# Patient Record
Sex: MALE | Race: WHITE
[De-identification: names, ages, dates, MRNs, and addresses within clinical notes are randomized per-mention and may not be internally consistent; named-entity substitution may affect disease eponyms.]

---

## 2021-08-16 ENCOUNTER — HOSPITAL ENCOUNTER (INPATIENT)
Dept: HOSPITAL 49 - FER | Age: 46
LOS: 3 days | Discharge: OTHER INSTITUTION | DRG: 208 | End: 2021-08-19
Attending: INTERNAL MEDICINE | Admitting: INTERNAL MEDICINE
Payer: COMMERCIAL

## 2021-08-16 VITALS — HEIGHT: 65.98 IN | BODY MASS INDEX: 35.08 KG/M2 | WEIGHT: 218.26 LBS

## 2021-08-16 DIAGNOSIS — U07.1: Primary | ICD-10-CM

## 2021-08-16 DIAGNOSIS — I10: ICD-10-CM

## 2021-08-16 DIAGNOSIS — Z87.442: ICD-10-CM

## 2021-08-16 DIAGNOSIS — J96.01: ICD-10-CM

## 2021-08-16 DIAGNOSIS — J12.82: ICD-10-CM

## 2021-08-16 LAB
BASOPHIL: 0.2 % (ref 0–2)
EOSINOPHIL: 0 % (ref 0–5)
HCT: 42.3 % (ref 42–52)
HGB BLD-MCNC: 14.4 G/DL (ref 13.2–18)
LYMPHOCYTE: 5.3 % (ref 15–48)
MCH RBC QN AUTO: 30 PG (ref 25–31)
MCHC RBC AUTO-ENTMCNC: 34 G/DL (ref 32–36)
MCV: 88.1 FL (ref 78–100)
MONOCYTE: 2.6 % (ref 0–12)
MPV: 12.1 FL (ref 6–9.5)
NEUTROPHIL: 91.5 % (ref 41–80)
NRBC: 0
PLT: 207 K/UL (ref 150–400)
RBC MORPHOLOGY: NORMAL
RBC: 4.8 M/UL (ref 4.7–6)
RDW: 14.5 % (ref 11.5–14)
WBC: 9.1 K/UL (ref 4–10.5)

## 2021-08-16 PROCEDURE — C9113 INJ PANTOPRAZOLE SODIUM, VIA: HCPCS

## 2021-08-16 PROCEDURE — C9399 UNCLASSIFIED DRUGS OR BIOLOG: HCPCS

## 2021-08-16 PROCEDURE — U0002 COVID-19 LAB TEST NON-CDC: HCPCS

## 2021-08-17 LAB
ALBUMIN SERPL-MCNC: 3.1 G/DL (ref 3.4–5)
ALKALINE PHOSHATASE: 57 U/L (ref 46–116)
ALT SERPL-CCNC: 78 U/L (ref 16–63)
AST: 55 U/L (ref 15–37)
BACTERIA: (no result)
BILIRUBIN - TOTAL: 0.5 MG/DL (ref 0.2–1)
BILIRUBIN: NEGATIVE MG/DL
BLOOD: (no result) ERY/UL
BUN SERPL-MCNC: 14 MG/DL (ref 7–18)
BUN/CREAT RATIO (CALC): 13.3 RATIO
C-REACTIVE PROTEIN: >18 MG/DL (ref ?–0.9)
CHLORIDE: 96 MMOL/L (ref 98–107)
CLARITY UR: CLEAR
CO2 (BICARBONATE): 26 MMOL/L (ref 21–32)
COLOR: (no result)
CREATININE: 1.05 MG/DL (ref 0.67–1.17)
GLOBULIN (CALCULATION): 3.6 G/DL
GLUCOSE (U): NORMAL MG/DL
GLUCOSE SERPL-MCNC: 154 MG/DL (ref 74–106)
LACTIC ACID: 1.8 MMOL/L (ref 0.4–1.9)
LDH1 SERPL-CCNC: 380 U/L (ref 85–227)
LEUKOCYTES: NEGATIVE LEU/UL
MAGNESIUM SERPL-MCNC: 2 MG/DL (ref 1.8–2.4)
NITRITE: NEGATIVE MG/DL
POTASSIUM: 4 MMOL/L (ref 3.5–5.1)
PRO-BNP: 179 PG/ML (ref ?–125)
PROTEIN: (no result) MG/DL
SPECIFIC GRAVITY: 1.01 (ref 1–1.03)
TOTAL PROTEIN: 6.7 G/DL (ref 6.4–8.2)
URINARY RBC: (no result)
URINARY WBC: (no result)
UROBILINOGEN: 0.2 MG/DL (ref 0.2–1)

## 2021-08-17 PROCEDURE — XW033E5 INTRODUCTION OF REMDESIVIR ANTI-INFECTIVE INTO PERIPHERAL VEIN, PERCUTANEOUS APPROACH, NEW TECHNOLOGY GROUP 5: ICD-10-PCS | Performed by: INTERNAL MEDICINE

## 2021-08-17 PROCEDURE — 5A09457 ASSISTANCE WITH RESPIRATORY VENTILATION, 24-96 CONSECUTIVE HOURS, CONTINUOUS POSITIVE AIRWAY PRESSURE: ICD-10-PCS | Performed by: INTERNAL MEDICINE

## 2021-08-17 PROCEDURE — 8E0ZXY6 ISOLATION: ICD-10-PCS | Performed by: INTERNAL MEDICINE

## 2021-08-18 LAB
ALBUMIN SERPL-MCNC: 2.5 G/DL (ref 3.4–5)
ALKALINE PHOSHATASE: 55 U/L (ref 46–116)
ALT SERPL-CCNC: 63 U/L (ref 16–63)
AST: 59 U/L (ref 15–37)
BASOPHIL: 0.1 % (ref 0–2)
BILIRUBIN - TOTAL: 0.5 MG/DL (ref 0.2–1)
BUN SERPL-MCNC: 24 MG/DL (ref 7–18)
BUN/CREAT RATIO (CALC): 24.7 RATIO
CHLORIDE: 104 MMOL/L (ref 98–107)
CO2 (BICARBONATE): 24 MMOL/L (ref 21–32)
CREATININE: 0.97 MG/DL (ref 0.67–1.17)
EOSINOPHIL: 0 % (ref 0–5)
GLOBULIN (CALCULATION): 4.2 G/DL
GLUCOSE SERPL-MCNC: 126 MG/DL (ref 74–106)
HCT: 40.8 % (ref 42–52)
HGB BLD-MCNC: 13.6 G/DL (ref 13.2–18)
LYMPHOCYTE: 8.6 % (ref 15–48)
MCH RBC QN AUTO: 30 PG (ref 25–31)
MCHC RBC AUTO-ENTMCNC: 33.3 G/DL (ref 32–36)
MCV: 90.1 FL (ref 78–100)
MONOCYTE: 4.5 % (ref 0–12)
MPV: 12.1 FL (ref 6–9.5)
NEUTROPHIL: 86.1 % (ref 41–80)
NRBC: 0
PLT: 251 K/UL (ref 150–400)
POTASSIUM: 4.8 MMOL/L (ref 3.5–5.1)
RBC MORPHOLOGY: NORMAL
RBC: 4.53 M/UL (ref 4.7–6)
RDW: 15.1 % (ref 11.5–14)
TOTAL PROTEIN: 6.7 G/DL (ref 6.4–8.2)
WBC: 9.1 K/UL (ref 4–10.5)

## 2021-08-18 PROCEDURE — 0BH17EZ INSERTION OF ENDOTRACHEAL AIRWAY INTO TRACHEA, VIA NATURAL OR ARTIFICIAL OPENING: ICD-10-PCS | Performed by: INTERNAL MEDICINE

## 2021-08-18 PROCEDURE — 5A1935Z RESPIRATORY VENTILATION, LESS THAN 24 CONSECUTIVE HOURS: ICD-10-PCS | Performed by: INTERNAL MEDICINE

## 2021-08-18 NOTE — NUR
MD NOTIFIED OF PT VITALS AT THIS TIME BY TELEPHONE. /86 , HR 86
RR 39 O2 85%. MD STATED TO MONITOR PT CLOSELY.

## 2021-08-18 NOTE — NUR
PT GIVEN MEDICATION FOR SEDATION AT THIS TIME. MD BEGAN INTUBATION. PT VITALS
/91  HR 93 O2 61% ON BIPAP 100% FIO2. ET TUBE PLACED BUT PT BIT
CUFF AND DEFLATED IT, O2 SATS TRENDING DOWN TO 48%, A CODE WAS CALLED AT THIS
TIME WHILE MD TOOK A BOUGIE TO REPLACE ET TUBE.
 
1352- A SECOND INTUBATION ATTEMPT WAS MADE BY MD TO REPLACE ET TUBE. PT PLACED
ON VENT AND CURRENT VITALS ARE /105   RR 33 O2 88%. MD NOTIFIED OF
VITALS AT 1440. NO ORDERED VERSED PUSH AND FETANYL IVP.
 
1415- PTS WIFE WAS NOTIFIED OF INTUBATION, CURRENT VITALS, AND STATUS.

## 2021-08-19 LAB
ALBUMIN SERPL-MCNC: 2.1 G/DL (ref 3.4–5)
ALKALINE PHOSHATASE: 47 U/L (ref 46–116)
ALT SERPL-CCNC: 44 U/L (ref 16–63)
AST: 28 U/L (ref 15–37)
BASOPHIL: 0.1 % (ref 0–2)
BILIRUBIN - TOTAL: 0.4 MG/DL (ref 0.2–1)
BUN SERPL-MCNC: 21 MG/DL (ref 7–18)
BUN/CREAT RATIO (CALC): 25.6 RATIO
C-REACTIVE PROTEIN: 17.8 MG/DL (ref ?–0.9)
CHLORIDE: 104 MMOL/L (ref 98–107)
CO2 (BICARBONATE): 25 MMOL/L (ref 21–32)
CREATININE: 0.82 MG/DL (ref 0.67–1.17)
EOSINOPHIL: 0 % (ref 0–5)
GLOBULIN (CALCULATION): 4 G/DL
GLUCOSE SERPL-MCNC: 203 MG/DL (ref 74–106)
HCT: 35.5 % (ref 42–52)
HGB BLD-MCNC: 11.7 G/DL (ref 13.2–18)
LYMPHOCYTE: 10.8 % (ref 15–48)
MAGNESIUM SERPL-MCNC: 2.6 MG/DL (ref 1.8–2.4)
MCH RBC QN AUTO: 30 PG (ref 25–31)
MCHC RBC AUTO-ENTMCNC: 33 G/DL (ref 32–36)
MCV: 91 FL (ref 78–100)
MONOCYTE: 5.7 % (ref 0–12)
MPV: 11.8 FL (ref 6–9.5)
NEUTROPHIL: 82.2 % (ref 41–80)
NRBC: 0
PLT: 268 K/UL (ref 150–400)
POTASSIUM: 5 MMOL/L (ref 3.5–5.1)
RBC MORPHOLOGY: NORMAL
RBC: 3.9 M/UL (ref 4.7–6)
RDW: 15.2 % (ref 11.5–14)
TOTAL PROTEIN: 6.1 G/DL (ref 6.4–8.2)
WBC: 7.7 K/UL (ref 4–10.5)

## 2021-08-19 NOTE — NUR
0942 PATIENT TRANSPORTED BY [facility] TO [facility]. PATIENT SENT ON
PROPOFOL WITH 60 LEFT IN BOTTLE AND VERSED WITH 80 ML LEFT IN BAG. ATTEMPTED
TO CALL FAMILY AND DID NOT REACH. REPORT CALLED TO LYNNE AT [facility]